# Patient Record
Sex: MALE | Race: WHITE | NOT HISPANIC OR LATINO
[De-identification: names, ages, dates, MRNs, and addresses within clinical notes are randomized per-mention and may not be internally consistent; named-entity substitution may affect disease eponyms.]

---

## 2022-01-30 ENCOUNTER — TRANSCRIPTION ENCOUNTER (OUTPATIENT)
Age: 87
End: 2022-01-30

## 2023-10-26 ENCOUNTER — OUTPATIENT (OUTPATIENT)
Dept: OUTPATIENT SERVICES | Facility: HOSPITAL | Age: 88
LOS: 1 days | Discharge: HOME | End: 2023-10-26
Payer: MEDICARE

## 2023-10-26 DIAGNOSIS — D23.4 OTHER BENIGN NEOPLASM OF SKIN OF SCALP AND NECK: Chronic | ICD-10-CM

## 2023-10-26 DIAGNOSIS — I25.10 ATHEROSCLEROTIC HEART DISEASE OF NATIVE CORONARY ARTERY WITHOUT ANGINA PECTORIS: ICD-10-CM

## 2023-10-26 LAB
ANION GAP SERPL CALC-SCNC: 9 MMOL/L — SIGNIFICANT CHANGE UP (ref 7–14)
ANION GAP SERPL CALC-SCNC: 9 MMOL/L — SIGNIFICANT CHANGE UP (ref 7–14)
BUN SERPL-MCNC: 26 MG/DL — HIGH (ref 7–23)
BUN SERPL-MCNC: 26 MG/DL — HIGH (ref 7–23)
CALCIUM SERPL-MCNC: 9.2 MG/DL — SIGNIFICANT CHANGE UP (ref 8.4–10.5)
CALCIUM SERPL-MCNC: 9.2 MG/DL — SIGNIFICANT CHANGE UP (ref 8.4–10.5)
CHLORIDE SERPL-SCNC: 111 MMOL/L — HIGH (ref 98–107)
CHLORIDE SERPL-SCNC: 111 MMOL/L — HIGH (ref 98–107)
CO2 SERPL-SCNC: 26 MMOL/L — SIGNIFICANT CHANGE UP (ref 22–31)
CO2 SERPL-SCNC: 26 MMOL/L — SIGNIFICANT CHANGE UP (ref 22–31)
CREAT SERPL-MCNC: 1.21 MG/DL — SIGNIFICANT CHANGE UP (ref 0.5–1.3)
CREAT SERPL-MCNC: 1.21 MG/DL — SIGNIFICANT CHANGE UP (ref 0.5–1.3)
EGFR: 57 ML/MIN/1.73M2 — LOW
EGFR: 57 ML/MIN/1.73M2 — LOW
GLUCOSE SERPL-MCNC: 109 MG/DL — HIGH (ref 70–99)
GLUCOSE SERPL-MCNC: 109 MG/DL — HIGH (ref 70–99)
HCT VFR BLD CALC: 27.3 % — LOW (ref 39–50)
HCT VFR BLD CALC: 27.3 % — LOW (ref 39–50)
HGB BLD-MCNC: 9.4 G/DL — LOW (ref 13–17)
HGB BLD-MCNC: 9.4 G/DL — LOW (ref 13–17)
MCHC RBC-ENTMCNC: 32.2 PG — SIGNIFICANT CHANGE UP (ref 27–34)
MCHC RBC-ENTMCNC: 32.2 PG — SIGNIFICANT CHANGE UP (ref 27–34)
MCHC RBC-ENTMCNC: 34.4 GM/DL — SIGNIFICANT CHANGE UP (ref 32–36)
MCHC RBC-ENTMCNC: 34.4 GM/DL — SIGNIFICANT CHANGE UP (ref 32–36)
MCV RBC AUTO: 93.5 FL — SIGNIFICANT CHANGE UP (ref 80–100)
MCV RBC AUTO: 93.5 FL — SIGNIFICANT CHANGE UP (ref 80–100)
NRBC # BLD: 0 /100 WBCS — SIGNIFICANT CHANGE UP (ref 0–0)
NRBC # BLD: 0 /100 WBCS — SIGNIFICANT CHANGE UP (ref 0–0)
NRBC # FLD: 0 K/UL — SIGNIFICANT CHANGE UP (ref 0–0)
NRBC # FLD: 0 K/UL — SIGNIFICANT CHANGE UP (ref 0–0)
PLATELET # BLD AUTO: 87 K/UL — LOW (ref 150–400)
PLATELET # BLD AUTO: 87 K/UL — LOW (ref 150–400)
POTASSIUM SERPL-MCNC: 5.1 MMOL/L — SIGNIFICANT CHANGE UP (ref 3.5–5.3)
POTASSIUM SERPL-MCNC: 5.1 MMOL/L — SIGNIFICANT CHANGE UP (ref 3.5–5.3)
POTASSIUM SERPL-SCNC: 5.1 MMOL/L — SIGNIFICANT CHANGE UP (ref 3.5–5.3)
POTASSIUM SERPL-SCNC: 5.1 MMOL/L — SIGNIFICANT CHANGE UP (ref 3.5–5.3)
RBC # BLD: 2.92 M/UL — LOW (ref 4.2–5.8)
RBC # BLD: 2.92 M/UL — LOW (ref 4.2–5.8)
RBC # FLD: 15.7 % — HIGH (ref 10.3–14.5)
RBC # FLD: 15.7 % — HIGH (ref 10.3–14.5)
SODIUM SERPL-SCNC: 146 MMOL/L — HIGH (ref 135–145)
SODIUM SERPL-SCNC: 146 MMOL/L — HIGH (ref 135–145)
WBC # BLD: 2.56 K/UL — LOW (ref 3.8–10.5)
WBC # BLD: 2.56 K/UL — LOW (ref 3.8–10.5)
WBC # FLD AUTO: 2.56 K/UL — LOW (ref 3.8–10.5)
WBC # FLD AUTO: 2.56 K/UL — LOW (ref 3.8–10.5)

## 2023-10-26 PROCEDURE — 93010 ELECTROCARDIOGRAM REPORT: CPT | Mod: 59

## 2023-10-26 PROCEDURE — 93306 TTE W/DOPPLER COMPLETE: CPT | Mod: 26

## 2023-10-26 PROCEDURE — 93458 L HRT ARTERY/VENTRICLE ANGIO: CPT | Mod: 26

## 2023-10-26 PROCEDURE — 99152 MOD SED SAME PHYS/QHP 5/>YRS: CPT

## 2023-10-26 RX ORDER — CLOPIDOGREL BISULFATE 75 MG/1
1 TABLET, FILM COATED ORAL
Refills: 0 | DISCHARGE

## 2023-10-26 RX ORDER — METOPROLOL TARTRATE 50 MG
1 TABLET ORAL
Refills: 0 | DISCHARGE

## 2023-10-26 RX ORDER — ASPIRIN/CALCIUM CARB/MAGNESIUM 324 MG
1 TABLET ORAL
Refills: 0 | DISCHARGE

## 2023-10-26 RX ORDER — TAMSULOSIN HYDROCHLORIDE 0.4 MG/1
1 CAPSULE ORAL
Refills: 0 | DISCHARGE

## 2023-10-26 RX ORDER — FINASTERIDE 5 MG/1
1 TABLET, FILM COATED ORAL
Refills: 0 | DISCHARGE

## 2023-10-26 RX ORDER — LOSARTAN POTASSIUM 100 MG/1
1 TABLET, FILM COATED ORAL
Refills: 0 | DISCHARGE

## 2023-10-26 RX ORDER — ROSUVASTATIN CALCIUM 5 MG/1
1 TABLET ORAL
Refills: 0 | DISCHARGE

## 2023-10-26 RX ORDER — SODIUM CHLORIDE 9 MG/ML
3 INJECTION INTRAMUSCULAR; INTRAVENOUS; SUBCUTANEOUS EVERY 8 HOURS
Refills: 0 | Status: DISCONTINUED | OUTPATIENT
Start: 2023-10-26 | End: 2023-11-09

## 2023-10-26 NOTE — CONSULT NOTE ADULT - SUBJECTIVE AND OBJECTIVE BOX
89 year old male with HTN, hyperlipidemia, BPH, known CAD with PTCA/stent 2011 and 2016 who presented to his Cardiologist complaining of CORDOBA  Underwent a stress teste 10/3/23 with EF 49%, mild distal anterior and apical defect wit ST changes in leads II, III< AVF.   In light of patients cardiac history, symptoms and abnormal noninvasive test findings there is high suspicion for CAD progression. Patient is now referred to [facility] for a cardiac catheterization with possible PTCA/stent.             Orders last 24 hours:  Basic Metabolic Panel: STAT (10-26-23 @ 08:02)  Complete Blood Count: STAT (10-26-23 @ 08:02)  12 Lead ECG:   Provider's Contact #: [phone] (10-26-23 @ 08:02)  Diet, NPO:   Except Medications  With Ice Chips/Sips of Water (10-26-23 @ 08:02)  sodium chloride 0.9% lock flush:   3 milliLiter(s), IV Push, every 8 hours  Administration Instructions: For lock use only. Do not infuse into patient. (10-26-23 @ 08:02)      Allergies    quinine (Unknown)  sulfa drugs (Unknown)    Intolerances        REVIEW OF SYSTEMS:  CARDIOVASCULAR: No chest pain, palpitations, dizziness, or leg swelling; no shortness of breath     RESPIRATORY: No cough, wheezing, chills or hemoptysis; No shortness of breath    GASTROINTESTINAL: No abdominal or epigastric pain. No nausea, vomiting, or hematemesis; No diarrhea or constipation. No melena or hematochezia.    NEUROLOGICAL: No headaches, memory loss, loss of strength, numbness      PHYSICAL EXAM:  Vital Signs Last 24 Hrs  T(C): --  T(F): --  HR: --  BP: --  BP(mean): --  RR: --  SpO2: --        GENERAL: NAD, well-groomed, well-developed  HEAD:  Atraumatic, Normocephalic  EYES: EOMI, PERRLA, conjunctiva and sclera clear  NECK: Supple, No JVD, Normal thyroid  NERVOUS SYSTEM:  Alert & Oriented X3, Good concentration;  and symmetric  CHEST/LUNG: Clear to auscultation bilaterally; No rales, rhonchi, wheezing, or rubs  HEART: S1S2 regular, without murmur, rub nor gallop  ABDOMEN: Soft, Nontender, Nondistended; Bowel sounds present                assessment:  CAD; anemia.       Plan:   cath today.  D/W Dr. Cardona, and patient's daughter.

## 2023-10-26 NOTE — H&P CARDIOLOGY - HISTORY OF PRESENT ILLNESS
89 year old male with HTN, hyperlipidemia, BPH, known CAD with PTCA/stent 2011 and 2016 who presented to his Cardiologist complaining of   Underwent a stress teste 10/3/23 with EF 49%, mild distal anterior and apical defect wit ST changes in leads II, III< AVF.   In light of patients cardiac history, symptoms and abnormal noninvasive test findings there is high suspicion for CAD progression. Patient is now referred to [facility] for a cardiac catheterization with possible PTCA/stent.    89 year old male with HTN, hyperlipidemia, BPH, known CAD with PTCA/stent 2011 and 2016 who presented to his Cardiologist complaining of SOB for the past 5years, that has progressedslowly over the past 1 year with SOb walking up 2-3 flights of stairs, relieved with rest.  Denies dizziness, chest pain, syncope, increase in fatigue.   Underwent a stress teste 10/3/23 with EF 49%, mild distal anterior and apical defect wit ST changes in leads II, III< AVF.   In light of patients cardiac history, symptoms and abnormal noninvasive test findings there is high suspicion for CAD progression. Patient is now referred to [facility] for a cardiac catheterization with possible PTCA/stent.

## 2023-10-26 NOTE — H&P CARDIOLOGY - NSICDXPASTMEDICALHX_GEN_ALL_CORE_FT
PAST MEDICAL HISTORY:  BPH (Benign Prostatic Hypertrophy)     Chronic Low Back Pain     Coronary Stent endeavor x 1 to LAD    H/O heart artery stent     HLD (hyperlipidemia)     Past Heart Attack 2010

## 2023-10-26 NOTE — CHART NOTE - NSCHARTNOTEFT_GEN_A_CORE
labs submission of event note:   labs remarkble for h/h 9/27 pre cath, patient denied BRBPR, melena, never had colonoscopy, no family history of colon CA ; discussed with Dr Cardona who spoke with family and patient pre cath and agreed to procede with cardiac cath to determine coronary anatomy.   Patient aware to have anemia work up post procedure as an outpatient and verbalized understanding

## 2023-10-27 NOTE — CHART NOTE - NSCHARTNOTEFT_GEN_A_CORE
contacted by patient s/p diagnostic cardiac catheterization via RFA access 10/26. pt with hematoma during RN sheath pull post procedure 10/26 that I personally compressed to soft and remained nontender, no bruit of right groin.    patient called today and c/o right calf cramps last night and this morning; advised patient cramping no directly related to cath. Patient admits to normal sensation, normal color and same temperature when compared to left  leg.  I asked him about right groin site, he removed bandage when he was on the phone, he states site is ecchymotic, soft with "pea size" to touch at puncture site. Admits to nontender, no swelling, no hard lump,  no bleed  I reassured patient, advised patient to follow up with Dr Leyva office on Monday for follow up  Advised patient to present to the nearest ED if any changes in groin site over weekend or leg concerns  discussed above with Dr Cardona and Dr Castellano who agree with plan

## 2024-05-17 PROBLEM — N40.0 BPH (BENIGN PROSTATIC HYPERPLASIA): Status: ACTIVE | Noted: 2024-05-17

## 2024-05-17 PROBLEM — I25.2 HISTORY OF MYOCARDIAL INFARCTION: Status: RESOLVED | Noted: 2024-05-17 | Resolved: 2024-05-17

## 2024-05-17 PROBLEM — Z95.5 HISTORY OF CORONARY ARTERY STENT PLACEMENT: Status: RESOLVED | Noted: 2024-05-17 | Resolved: 2024-05-17

## 2024-05-17 RX ORDER — CLOPIDOGREL 75 MG/1
75 TABLET, FILM COATED ORAL DAILY
Qty: 30 | Refills: 0 | Status: ACTIVE | COMMUNITY

## 2024-05-17 RX ORDER — MULTIVITAMIN
CAPSULE ORAL DAILY
Refills: 0 | Status: ACTIVE | COMMUNITY

## 2024-05-17 RX ORDER — FINASTERIDE 5 MG/1
5 TABLET, FILM COATED ORAL DAILY
Refills: 0 | Status: ACTIVE | COMMUNITY

## 2024-05-17 RX ORDER — ROSUVASTATIN CALCIUM 10 MG/1
10 TABLET, FILM COATED ORAL DAILY
Refills: 0 | Status: ACTIVE | COMMUNITY

## 2024-05-17 RX ORDER — TAMSULOSIN HYDROCHLORIDE 0.4 MG/1
0.4 CAPSULE ORAL DAILY
Refills: 0 | Status: ACTIVE | COMMUNITY

## 2024-05-20 ENCOUNTER — NON-APPOINTMENT (OUTPATIENT)
Age: 89
End: 2024-05-20

## 2024-05-20 DIAGNOSIS — N40.0 BENIGN PROSTATIC HYPERPLASIA WITHOUT LOWER URINARY TRACT SYMPMS: ICD-10-CM

## 2024-05-20 DIAGNOSIS — I25.2 OLD MYOCARDIAL INFARCTION: ICD-10-CM

## 2024-05-20 DIAGNOSIS — Z95.5 PRESENCE OF CORONARY ANGIOPLASTY IMPLANT AND GRAFT: ICD-10-CM

## 2024-05-21 ENCOUNTER — APPOINTMENT (OUTPATIENT)
Dept: CARDIOTHORACIC SURGERY | Facility: CLINIC | Age: 89
End: 2024-05-21

## 2024-05-21 ENCOUNTER — APPOINTMENT (OUTPATIENT)
Dept: CARDIOTHORACIC SURGERY | Facility: CLINIC | Age: 89
End: 2024-05-21
Payer: MEDICARE

## 2024-05-21 VITALS
WEIGHT: 155 LBS | DIASTOLIC BLOOD PRESSURE: 71 MMHG | HEIGHT: 69 IN | OXYGEN SATURATION: 96 % | HEART RATE: 66 BPM | SYSTOLIC BLOOD PRESSURE: 125 MMHG | RESPIRATION RATE: 18 BRPM | BODY MASS INDEX: 22.96 KG/M2

## 2024-05-21 DIAGNOSIS — Z78.9 OTHER SPECIFIED HEALTH STATUS: ICD-10-CM

## 2024-05-21 DIAGNOSIS — Z87.891 PERSONAL HISTORY OF NICOTINE DEPENDENCE: ICD-10-CM

## 2024-05-21 DIAGNOSIS — I25.10 ATHEROSCLEROTIC HEART DISEASE OF NATIVE CORONARY ARTERY W/OUT ANGINA PECTORIS: ICD-10-CM

## 2024-05-21 DIAGNOSIS — Z98.61 ATHEROSCLEROTIC HEART DISEASE OF NATIVE CORONARY ARTERY W/OUT ANGINA PECTORIS: ICD-10-CM

## 2024-05-21 DIAGNOSIS — Z80.0 FAMILY HISTORY OF MALIGNANT NEOPLASM OF DIGESTIVE ORGANS: ICD-10-CM

## 2024-05-21 DIAGNOSIS — Z82.49 FAMILY HISTORY OF ISCHEMIC HEART DISEASE AND OTHER DISEASES OF THE CIRCULATORY SYSTEM: ICD-10-CM

## 2024-05-21 DIAGNOSIS — I35.0 NONRHEUMATIC AORTIC (VALVE) STENOSIS: ICD-10-CM

## 2024-05-21 PROBLEM — E78.5 HYPERLIPIDEMIA, UNSPECIFIED: Chronic | Status: ACTIVE | Noted: 2023-10-26

## 2024-05-21 PROBLEM — Z95.5 PRESENCE OF CORONARY ANGIOPLASTY IMPLANT AND GRAFT: Chronic | Status: ACTIVE | Noted: 2023-10-26

## 2024-05-21 PROCEDURE — 99204 OFFICE O/P NEW MOD 45 MIN: CPT

## 2024-05-30 NOTE — HISTORY OF PRESENT ILLNESS
[FreeTextEntry1] : Mr. Rolle is a 89-year-old male referred by Dr. Castellano for evaluation of aortic stenosis. His past medical history includes HTN, HLD, BPH, and CAD with MI in 2010. He underwent PCI in 2011 and 2016.   He was hospitalized at [place] on February 22. He was scheduled for an outpatient transfusion for myelodysplastic syndrome (Hgb 7 at the time) that was diagnosed in November (and also treated with chemotherapy), when he syncopized outside of the building. He reports no prodrome, but his daughter notes significant dyspnea in the days leading up to his transfusion. He was hospitalized for 13 days, diuresed 20lbs (now at his baseline weight) and was diagnosed at that time with severe aortic stenosis. Evaluation at [place] determined he was unstable from a hematology perspective (Naun Lin) and was advised to be reassessed in three months.   Angiography at [facility] in October 2023 demonstrated patent stents in the LAD and LCX with mild to moderate distal LM and ostial LCX disease. The peak-to-peak gradient (LV-Ao) was 23 mmHg.   The TTE and KENNETH from [facility] were reviewed by Dr Rachael Cardona in detail with the following impressions: The TTE has no Dopplers. The KENNETH shows a tri leaflet calcified aortic valve. The KENNETH Doppler measurements appear underestimated. Dr Cardona also reviewed a TTE on 10/26/2023 at [facility] which demonstrated the following: an GENE= 1cm2. The peak/mean gradients= 56/31mmHg, peak velocity= 3.7m/s. with a DVI= 0.28.    He reports feeling well overall and remains independent in all activities. He reports no decline in stamina or activity tolerance. He does have exertional dyspnea with moderate activity. His daughter feels that he is more dyspneic and can only walk one block before having to stop and rest. His hospitalization was for syncope when he was going for a transfusion, having been recently diagnosed with MDS at that time (his Hb was 7). He was told of having crackles and CHF at that time, which prompted admission. TAVR was discussed at that time, which prompted further testing. He had a prolonged admission for a low-grade temperature and an elevated white count and was treated with antibiotics; no clear infectious etiology was identified. His Hematologist, Dr Naun Lin, felt he was not stable for any intervention at that time, as noted above. Since then, he was started on an injection, for 5 days per month, and has completed 3 cycles--with favorable response of his cell counts, per his daughter. He received platelet transfusions for the procedures done at [facility]. He also receives an erythropoietin injection when his counts warrant. He was discharged on Lasix, which was subsequently stopped. His daughter notes he was getting short of breath when taking stairs. He has no angina. He does note feeling winded with climbing "more than one flight of stairs" but is able to do 1 FOS without stopping.

## 2024-05-30 NOTE — DISCUSSION/SUMMARY
[FreeTextEntry1] : Mr. COX had moderate to severe AS on a TTE that was done at [facility] in October 2023. He was admitted with CORDOBA in February in the setting of new onset MDS and severe anemia. He feels better with MDS therapy (counts are up per his daughter, I don't have the labs) and the addition of Lasix. He had a CT at [facility]--we will need to obtain a CD with the images. He had an angiogram in October 2023 that does not need to be repeated (he has no angina). His next infusion (5-day course) starts on June 12th. I advised that he see Dr Lin for the treatment and discuss with him at that time preparation for TAVR, which he will likely need in the very near future. As such, return to see Dr Puentes, with a repeat TTE, in late June. We will discuss with Dr Lin at that time regarding his prognosis and treatment strategy and plan for TAVR if deemed appropriate. I have answered all questions in detail, and he is in agreement with this management strategy.

## 2024-05-30 NOTE — CARDIOLOGY SUMMARY
[de-identified] : None today [de-identified] : 2/27/24 KENNETH LVEF 45%, GENE 0.9 sqcm, mGr 18 mmHg, AoV 2.7 m/s, Low Flow, Low Gradient AS [de-identified] : 10/26/2023 dLM 40%, LAD patent stent, D1 100% stenosis with collaterals, oCx 60%, RCA minor patent stents to LAD and LCx

## 2024-05-30 NOTE — REVIEW OF SYSTEMS
[Feeling Tired] : feeling tired [SOB on Exertion] : shortness of breath during exertion [Easy Bruising] : a tendency for easy bruising [Negative] : Endocrine [Heart Rate Is Fast] : the heart rate was not fast [Chest Pain] : no chest pain [Palpitations] : no palpitations [Lower Ext Edema] : no extremity edema [Abdominal Pain] : no abdominal pain [Vomiting] : no vomiting [Dizziness] : no dizziness

## 2024-06-25 ENCOUNTER — RESULT REVIEW (OUTPATIENT)
Age: 89
End: 2024-06-25

## 2024-06-25 ENCOUNTER — OUTPATIENT (OUTPATIENT)
Dept: OUTPATIENT SERVICES | Facility: HOSPITAL | Age: 89
LOS: 1 days | End: 2024-06-25
Payer: MEDICARE

## 2024-06-25 ENCOUNTER — APPOINTMENT (OUTPATIENT)
Dept: CARDIOTHORACIC SURGERY | Facility: CLINIC | Age: 89
End: 2024-06-25
Payer: MEDICARE

## 2024-06-25 VITALS
RESPIRATION RATE: 15 BRPM | DIASTOLIC BLOOD PRESSURE: 70 MMHG | SYSTOLIC BLOOD PRESSURE: 138 MMHG | HEART RATE: 60 BPM | OXYGEN SATURATION: 100 % | TEMPERATURE: 98 F

## 2024-06-25 VITALS — HEIGHT: 69 IN | BODY MASS INDEX: 22.96 KG/M2 | WEIGHT: 155 LBS

## 2024-06-25 DIAGNOSIS — D23.4 OTHER BENIGN NEOPLASM OF SKIN OF SCALP AND NECK: Chronic | ICD-10-CM

## 2024-06-25 DIAGNOSIS — I35.0 NONRHEUMATIC AORTIC (VALVE) STENOSIS: ICD-10-CM

## 2024-06-25 PROCEDURE — 93306 TTE W/DOPPLER COMPLETE: CPT

## 2024-06-25 PROCEDURE — 93306 TTE W/DOPPLER COMPLETE: CPT | Mod: 26

## 2024-06-25 PROCEDURE — 99204 OFFICE O/P NEW MOD 45 MIN: CPT

## 2024-06-25 PROCEDURE — 93356 MYOCRD STRAIN IMG SPCKL TRCK: CPT

## 2024-06-25 RX ORDER — METOPROLOL SUCCINATE 25 MG/1
25 TABLET, EXTENDED RELEASE ORAL DAILY
Qty: 15 | Refills: 0 | Status: ACTIVE | COMMUNITY

## 2024-06-25 RX ORDER — LOSARTAN POTASSIUM 50 MG/1
50 TABLET, FILM COATED ORAL DAILY
Qty: 30 | Refills: 0 | Status: COMPLETED | COMMUNITY
End: 2024-06-25

## 2024-06-25 RX ORDER — FUROSEMIDE 20 MG/1
20 TABLET ORAL DAILY
Refills: 0 | Status: ACTIVE | COMMUNITY

## 2024-06-25 NOTE — PHYSICAL EXAM
[Left Carotid Bruit] : no bruit heard over the left carotid [Right Carotid Bruit] : no bruit heard over the right carotid [Fingers] :  capillary refill of the fingers was normal

## 2024-06-25 NOTE — REVIEW OF SYSTEMS
[Heart Rate Is Fast] : the heart rate was not fast [Chest Pain] : no chest pain [Palpitations] : no palpitations [Lower Ext Edema] : no extremity edema [Abdominal Pain] : no abdominal pain [Vomiting] : no vomiting [Dizziness] : no dizziness

## 2024-06-25 NOTE — HISTORY OF PRESENT ILLNESS
[Dyslipidemia] : Dyslipidemia [Hypertension] : Hypertension [Heart Failure within 2 Weeks] : Heart Failure in last 2 weeks [Class III] : Class III [Prior Heart Failure] : Prior Heart Failure [FreeTextEntry1] : Mr. Rolle is a 89 year old male referred by Dr. Castellano for evaluation of aortic stenosis. His past medical history includes HTN, HLD, BPH, and CAD with MI in 2010. He underwent PTCA/Stent in 2011 and 2016.   He was hospitalized at [place] on February 22. He was scheduled for an outpatient transfusion for myleodysplastic syndrome (Hgb 7 at the time) diagnosed in November also treated with chemotherapy, when he syncopized outside of the building. He reports no prodrome, but his daughter notes significant dyspnea in the days leading up to his transfusion. He was hospitalized for 13 days, diuresed 20lbs (now at his baseline weight) and was diagnosed at that time with severe aortic stenosis. Evaluation at [place] determined he was unstable from a hematology perspective (Naun Lin) and was advised to be reassessed in three months.   He reports feeling well overall and remains independent in all activities. He reports no decline in stamina or activity tolerance. He does have exertional dyspnea with moderate activity. His daughter feels that he is more dyspneic and can only walk one block before having to stop and rest.   I have reviewed his outpatient TTE today with DR. Cardona.  GENE ,93 DVI .25  Pg 47.9 MG 26.4.  He has nl flow low gradient AS.  he has no symptoms since his blood count has returned to normal.  he is active around the house and denies CORDOBA or exertional fatigue.  I have recommended that he continue to be active and we will reevaluate him in 3 mths with a follow up echo,

## 2024-10-22 ENCOUNTER — APPOINTMENT (OUTPATIENT)
Dept: CARDIOTHORACIC SURGERY | Facility: CLINIC | Age: 89
End: 2024-10-22

## 2024-11-21 ENCOUNTER — OUTPATIENT (OUTPATIENT)
Dept: OUTPATIENT SERVICES | Facility: HOSPITAL | Age: 88
LOS: 1 days | End: 2024-11-21
Payer: MEDICARE

## 2024-11-21 ENCOUNTER — RESULT REVIEW (OUTPATIENT)
Age: 89
End: 2024-11-21

## 2024-11-21 ENCOUNTER — APPOINTMENT (OUTPATIENT)
Dept: CARDIOTHORACIC SURGERY | Facility: CLINIC | Age: 89
End: 2024-11-21
Payer: MEDICARE

## 2024-11-21 VITALS
DIASTOLIC BLOOD PRESSURE: 50 MMHG | RESPIRATION RATE: 16 BRPM | SYSTOLIC BLOOD PRESSURE: 121 MMHG | OXYGEN SATURATION: 97 % | HEART RATE: 61 BPM

## 2024-11-21 VITALS — TEMPERATURE: 98.3 F

## 2024-11-21 DIAGNOSIS — D23.4 OTHER BENIGN NEOPLASM OF SKIN OF SCALP AND NECK: Chronic | ICD-10-CM

## 2024-11-21 DIAGNOSIS — I35.0 NONRHEUMATIC AORTIC (VALVE) STENOSIS: ICD-10-CM

## 2024-11-21 PROCEDURE — 76377 3D RENDER W/INTRP POSTPROCES: CPT | Mod: 26

## 2024-11-21 PROCEDURE — 76377 3D RENDER W/INTRP POSTPROCES: CPT

## 2024-11-21 PROCEDURE — 93306 TTE W/DOPPLER COMPLETE: CPT | Mod: 26

## 2024-11-21 PROCEDURE — 93306 TTE W/DOPPLER COMPLETE: CPT

## 2024-11-21 PROCEDURE — 93356 MYOCRD STRAIN IMG SPCKL TRCK: CPT

## 2024-11-21 PROCEDURE — 99214 OFFICE O/P EST MOD 30 MIN: CPT

## 2025-06-04 ENCOUNTER — OUTPATIENT (OUTPATIENT)
Dept: OUTPATIENT SERVICES | Facility: HOSPITAL | Age: 89
LOS: 1 days | End: 2025-06-04
Payer: MEDICARE

## 2025-06-04 DIAGNOSIS — D23.4 OTHER BENIGN NEOPLASM OF SKIN OF SCALP AND NECK: Chronic | ICD-10-CM

## 2025-06-04 DIAGNOSIS — I35.0 NONRHEUMATIC AORTIC (VALVE) STENOSIS: ICD-10-CM

## 2025-06-05 ENCOUNTER — APPOINTMENT (OUTPATIENT)
Dept: CARDIOTHORACIC SURGERY | Facility: CLINIC | Age: 89
End: 2025-06-05
Payer: MEDICARE

## 2025-06-05 ENCOUNTER — RESULT REVIEW (OUTPATIENT)
Age: 89
End: 2025-06-05

## 2025-06-05 VITALS
HEIGHT: 69 IN | RESPIRATION RATE: 16 BRPM | BODY MASS INDEX: 24.44 KG/M2 | WEIGHT: 165 LBS | OXYGEN SATURATION: 97 % | HEART RATE: 67 BPM | SYSTOLIC BLOOD PRESSURE: 115 MMHG | DIASTOLIC BLOOD PRESSURE: 76 MMHG

## 2025-06-05 PROCEDURE — 93356 MYOCRD STRAIN IMG SPCKL TRCK: CPT

## 2025-06-05 PROCEDURE — 99214 OFFICE O/P EST MOD 30 MIN: CPT

## 2025-06-05 PROCEDURE — 93306 TTE W/DOPPLER COMPLETE: CPT

## 2025-06-05 PROCEDURE — 93306 TTE W/DOPPLER COMPLETE: CPT | Mod: 26
